# Patient Record
(demographics unavailable — no encounter records)

---

## 2018-04-20 NOTE — CAT SCAN REPORT
FINAL REPORT



EXAM:  CT ANGIO CHEST



HISTORY:  ANEURYSM ABDOMINAL AORTIC 



TECHNIQUE:  CT angiography of the chest was performed. IV

contrast was administered. Coronal and sagittal reformatted

images were obtained.





PRIORS:  None.



FINDINGS:  

There is no thoracic aortic aneurysm seen. There is no aortic

dissection seen.

There is no significant mediastinal or hilar mass seen.

There are no filling defects seen within the pulmonary arterial

circulation to suggest pulmonary embolism.

There are no pleural effusions seen.

There is moderate to marked emphysema.  

There is no acute infiltrate.  

There is a nonspecific small nodular density in the right upper

lobe with spiculated margins. It measures about 5 mm.  

There is no pneumothorax seen.





IMPRESSION:  

There is no aortic dissection, thoracic aortic aneurysm or

pulmonary embolus seen.

Moderate to marked emphysema. 



5 mm nodular density in the right apex. If stability cannot be

established by comparison to old studies, recommend follow-up in

6 months then 18-24 months if no change.

## 2018-04-20 NOTE — CAT SCAN REPORT
FINAL REPORT



EXAM:  CT ANGIO ABDOMEN PELVIS



HISTORY:  aaa I71.4 



TECHNIQUE:  CT angiography of the abdomen and pelvis performed.

IV contrast was administered. No oral contrast was administered.

Axial images and coronal and sagittal reformatted images were

obtained.



PRIORS:  None.



FINDINGS:  

There emphysema in visualized lung bases.



There is gallbladder distension. Specifically, the gallbladder

measures 11.7 x 5.2 x 5.4 cm. I cannot confirm any

cholelithiasis.  

The visualized liver, spleen, pancreas, adrenal glands and

kidneys demonstrate no significant abnormalities.

There are aortoiliac atherosclerotic calcifications.

There is an infrarenal mid abdominal aortic aneurysm with

prominent mural thrombus. This measures approximately 3.4 cm AP x

3.4 cm transverse. Celiac trunk/axis, SMA and renal arteries are

patent.  

There is no evidence of intestinal obstruction.

The appendix is normal.

There is diverticulosis involving the left colon, but there is no

evidence of acute diverticulitis.

There are no abnormal fluid collections seen.

There is no free intraperitoneal air.





There is a large vascular enhancing mass in the bladder measuring

4.5 x 5.0 cm. This is highly concerning for malignancy. 



IMPRESSION:  

3.4 cm infrarenal mid abdominal aortic aneurysm. The 



5 cm vascular enhancing mass in the bladder which is very

concerning for malignancy.



Emphysema.



Diverticulosis. No acute diverticulitis seen.